# Patient Record
Sex: MALE | Race: WHITE | NOT HISPANIC OR LATINO
[De-identification: names, ages, dates, MRNs, and addresses within clinical notes are randomized per-mention and may not be internally consistent; named-entity substitution may affect disease eponyms.]

---

## 2022-10-26 ENCOUNTER — FORM ENCOUNTER (OUTPATIENT)
Age: 82
End: 2022-10-26

## 2022-10-27 PROBLEM — Z00.00 ENCOUNTER FOR PREVENTIVE HEALTH EXAMINATION: Status: ACTIVE | Noted: 2022-10-27

## 2022-10-31 ENCOUNTER — APPOINTMENT (OUTPATIENT)
Dept: ORTHOPEDIC SURGERY | Facility: CLINIC | Age: 82
End: 2022-10-31

## 2022-10-31 VITALS — BODY MASS INDEX: 32.9 KG/M2 | WEIGHT: 235 LBS | HEIGHT: 71 IN

## 2022-10-31 DIAGNOSIS — M25.569 PAIN IN UNSPECIFIED KNEE: ICD-10-CM

## 2022-10-31 DIAGNOSIS — E78.00 PURE HYPERCHOLESTEROLEMIA, UNSPECIFIED: ICD-10-CM

## 2022-10-31 DIAGNOSIS — I10 ESSENTIAL (PRIMARY) HYPERTENSION: ICD-10-CM

## 2022-10-31 DIAGNOSIS — Z78.9 OTHER SPECIFIED HEALTH STATUS: ICD-10-CM

## 2022-10-31 PROCEDURE — 99203 OFFICE O/P NEW LOW 30 MIN: CPT

## 2022-10-31 RX ORDER — OXYCODONE 5 MG/1
5 TABLET ORAL
Qty: 10 | Refills: 0 | Status: ACTIVE | COMMUNITY
Start: 2022-10-25

## 2022-10-31 RX ORDER — HYDROCHLOROTHIAZIDE 25 MG/1
25 TABLET ORAL
Qty: 90 | Refills: 0 | Status: ACTIVE | COMMUNITY
Start: 2022-08-17

## 2022-10-31 RX ORDER — IRBESARTAN 300 MG/1
300 TABLET ORAL
Qty: 90 | Refills: 0 | Status: ACTIVE | COMMUNITY
Start: 2022-08-17

## 2022-10-31 RX ORDER — ATORVASTATIN CALCIUM 10 MG/1
10 TABLET, FILM COATED ORAL
Qty: 90 | Refills: 0 | Status: ACTIVE | COMMUNITY
Start: 2022-08-17

## 2022-10-31 RX ORDER — METOPROLOL SUCCINATE 50 MG/1
50 TABLET, EXTENDED RELEASE ORAL
Qty: 90 | Refills: 0 | Status: ACTIVE | COMMUNITY
Start: 2022-08-05

## 2022-10-31 RX ORDER — ALLOPURINOL 300 MG/1
300 TABLET ORAL
Qty: 90 | Refills: 0 | Status: ACTIVE | COMMUNITY
Start: 2022-08-17

## 2022-11-01 PROBLEM — M25.569 KNEE PAIN: Status: ACTIVE | Noted: 2022-11-01

## 2022-11-01 NOTE — ASSESSMENT
[FreeTextEntry1] : Left knee suspected gout - reviewed radiographs and overall pathoanatomy. Discussed the severity of his presentation is suggestive of inflammatory arthropathy - patient with hx of gout. WBAT, recommend followup with rheum in case of flare up.\par \par F/u prn

## 2022-11-01 NOTE — HISTORY OF PRESENT ILLNESS
[de-identified] : 82M, PMHX of  HTN, HLD.. presents with left leg pain for approx 1 month. Reports on 10/25/22 pain was so severe called an ambulance and was brought ot [facility]. Admits to going to [facility], radiographs were obtained negative for fracture/dislocation. Admits to trying to wear a brace but was worse. Ambulating with walker. 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 Reports last few days, after taking oxy for 3 days, and ibuprofen, starting to feel better. can finally do weight bearing. (has images on a disc). No constitutional symptoms - overall, pain much improved. Was worked up for DVT and infection at [facility] - negative. Hx of gout, on allopurinol.

## 2022-11-01 NOTE — IMAGING
[de-identified] : Left knee with minimal swelling, no erythema. Mild ttp throughout but tolerable. Able to straight leg raise. Active arc from 0 to 100. +TA, EHL, GA. SILT throughout.\par \par Left knee radiographs from [facility] with medial compartment arthrosis, no overt chondrocalcinosis.

## 2023-08-25 ENCOUNTER — OFFICE (OUTPATIENT)
Dept: URBAN - METROPOLITAN AREA CLINIC 12 | Facility: CLINIC | Age: 83
Setting detail: OPHTHALMOLOGY
End: 2023-08-25
Payer: MEDICARE

## 2023-08-25 DIAGNOSIS — H35.3131: ICD-10-CM

## 2023-08-25 DIAGNOSIS — H26.493: ICD-10-CM

## 2023-08-25 DIAGNOSIS — Z96.1: ICD-10-CM

## 2023-08-25 DIAGNOSIS — H16.223: ICD-10-CM

## 2023-08-25 PROCEDURE — 92014 COMPRE OPH EXAM EST PT 1/>: CPT | Performed by: OPHTHALMOLOGY

## 2023-08-25 PROCEDURE — 92250 FUNDUS PHOTOGRAPHY W/I&R: CPT | Performed by: OPHTHALMOLOGY

## 2023-08-25 ASSESSMENT — SUPERFICIAL PUNCTATE KERATITIS (SPK)
OS_SPK: T
OD_SPK: T

## 2023-08-25 ASSESSMENT — REFRACTION_MANIFEST
OD_SPHERE: -0.50
OS_SPHERE: -0.50
OS_ADD: +2.50
OD_CYLINDER: -0.50
OS_CYLINDER: -0.25
OS_AXIS: 103
OD_AXIS: 59
OD_ADD: +2.50

## 2023-08-25 ASSESSMENT — VISUAL ACUITY
OS_BCVA: 20/20
OD_BCVA: 20/20

## 2023-08-25 ASSESSMENT — REFRACTION_CURRENTRX
OS_OVR_VA: 20/
OD_VPRISM_DIRECTION: BF
OS_SPHERE: PLANO
OS_CYLINDER: SPHERE
OD_ADD: +2.50
OS_AXIS: 0
OS_ADD: +2.50
OD_SPHERE: PLANO
OD_VPRISM_DIRECTION: BF
OD_CYLINDER: 0.00
OD_CYLINDER: -0.50
OS_VPRISM_DIRECTION: BF
OD_AXIS: 0
OS_SPHERE: -0.50
OD_AXIS: 001
OS_CYLINDER: -0.50
OD_OVR_VA: 20/
OS_ADD: +2.75
OS_AXIS: 005
OS_OVR_VA: 20/
OD_SPHERE: -0.50
OD_ADD: +2.75
OD_OVR_VA: 20/
OS_VPRISM_DIRECTION: BF

## 2023-08-25 ASSESSMENT — REFRACTION_AUTOREFRACTION
OD_CYLINDER: -0.50
OD_AXIS: 59
OS_CYLINDER: -0.25
OS_SPHERE: -0.50
OD_SPHERE: -0.50
OS_AXIS: 103

## 2023-08-25 ASSESSMENT — TONOMETRY
OD_IOP_MMHG: 13
OS_IOP_MMHG: 12

## 2023-08-25 ASSESSMENT — KERATOMETRY
OD_K2POWER_DIOPTERS: 44.00
METHOD_AUTO_MANUAL: AUTO
OS_AXISANGLE_DEGREES: 87
OD_AXISANGLE_DEGREES: 98
OS_K2POWER_DIOPTERS: 44.00
OD_K1POWER_DIOPTERS: 43.00
OS_K1POWER_DIOPTERS: 43.00

## 2023-08-25 ASSESSMENT — CONFRONTATIONAL VISUAL FIELD TEST (CVF)
OD_FINDINGS: FULL
OS_FINDINGS: FULL

## 2023-08-25 ASSESSMENT — SPHEQUIV_DERIVED
OD_SPHEQUIV: -0.75
OD_SPHEQUIV: -0.75
OS_SPHEQUIV: -0.625
OS_SPHEQUIV: -0.625

## 2023-08-25 ASSESSMENT — AXIALLENGTH_DERIVED
OD_AL: 23.8878
OD_AL: 23.8878
OS_AL: 23.8379
OS_AL: 23.8379

## 2025-03-02 ENCOUNTER — OFFICE (OUTPATIENT)
Dept: URBAN - METROPOLITAN AREA CLINIC 12 | Facility: CLINIC | Age: 85
Setting detail: OPHTHALMOLOGY
End: 2025-03-02
Payer: MEDICARE

## 2025-03-02 DIAGNOSIS — H35.3131: ICD-10-CM

## 2025-03-02 DIAGNOSIS — H26.493: ICD-10-CM

## 2025-03-02 DIAGNOSIS — Z96.1: ICD-10-CM

## 2025-03-02 DIAGNOSIS — H16.223: ICD-10-CM

## 2025-03-02 PROCEDURE — 92250 FUNDUS PHOTOGRAPHY W/I&R: CPT | Performed by: OPHTHALMOLOGY

## 2025-03-02 PROCEDURE — 92014 COMPRE OPH EXAM EST PT 1/>: CPT | Performed by: OPHTHALMOLOGY

## 2025-03-02 ASSESSMENT — REFRACTION_CURRENTRX
OS_VPRISM_DIRECTION: BF
OD_OVR_VA: 20/
OD_CYLINDER: 0.00
OS_SPHERE: PLANO
OD_ADD: +2.75
OD_CYLINDER: -0.50
OS_AXIS: 005
OD_AXIS: 001
OD_ADD: +2.75
OD_OVR_VA: 20/
OS_OVR_VA: 20/
OD_VPRISM_DIRECTION: BF
OS_CYLINDER: -0.50
OS_SPHERE: -0.50
OS_VPRISM_DIRECTION: BF
OD_SPHERE: PLANO
OD_VPRISM_DIRECTION: BF
OS_ADD: +2.75
OS_AXIS: 0
OS_ADD: +2.50
OD_AXIS: 0
OS_OVR_VA: 20/
OD_SPHERE: -0.50
OS_CYLINDER: SPHERE

## 2025-03-02 ASSESSMENT — REFRACTION_AUTOREFRACTION
OS_CYLINDER: -0.50
OD_CYLINDER: -0.50
OS_SPHERE: -0.25
OD_AXIS: 045
OD_SPHERE: -0.75
OS_AXIS: 116

## 2025-03-02 ASSESSMENT — REFRACTION_MANIFEST
OD_SPHERE: -0.75
OS_SPHERE: -0.25
OS_ADD: +2.50
OD_ADD: +2.50
OD_CYLINDER: -0.50
OS_CYLINDER: -0.50
OS_AXIS: 116
OS_VA1: 20/20
OD_AXIS: 045
OD_VA1: 20/20

## 2025-03-02 ASSESSMENT — KERATOMETRY
OD_K2POWER_DIOPTERS: 44.25
OD_AXISANGLE_DEGREES: 093
OS_K2POWER_DIOPTERS: 44.00
METHOD_AUTO_MANUAL: AUTO
OS_AXISANGLE_DEGREES: 074
OD_K1POWER_DIOPTERS: 43.25
OS_K1POWER_DIOPTERS: 43.25

## 2025-03-02 ASSESSMENT — TONOMETRY: OS_IOP_MMHG: 14

## 2025-03-02 ASSESSMENT — CONFRONTATIONAL VISUAL FIELD TEST (CVF)
OD_FINDINGS: FULL
OS_FINDINGS: FULL

## 2025-03-02 ASSESSMENT — SUPERFICIAL PUNCTATE KERATITIS (SPK)
OD_SPK: T
OS_SPK: T

## 2025-03-02 ASSESSMENT — VISUAL ACUITY
OS_BCVA: 20/40+2
OD_BCVA: 20/20